# Patient Record
Sex: MALE | Race: WHITE | NOT HISPANIC OR LATINO
[De-identification: names, ages, dates, MRNs, and addresses within clinical notes are randomized per-mention and may not be internally consistent; named-entity substitution may affect disease eponyms.]

---

## 2017-06-18 ENCOUNTER — TRANSCRIPTION ENCOUNTER (OUTPATIENT)
Age: 42
End: 2017-06-18

## 2018-02-10 ENCOUNTER — TRANSCRIPTION ENCOUNTER (OUTPATIENT)
Age: 43
End: 2018-02-10

## 2020-11-04 PROBLEM — Z00.00 ENCOUNTER FOR PREVENTIVE HEALTH EXAMINATION: Status: ACTIVE | Noted: 2020-11-04

## 2020-12-10 ENCOUNTER — APPOINTMENT (OUTPATIENT)
Dept: NEUROLOGY | Facility: CLINIC | Age: 45
End: 2020-12-10

## 2022-02-03 ENCOUNTER — APPOINTMENT (OUTPATIENT)
Dept: NEUROLOGY | Facility: CLINIC | Age: 47
End: 2022-02-03
Payer: COMMERCIAL

## 2022-02-03 ENCOUNTER — NON-APPOINTMENT (OUTPATIENT)
Age: 47
End: 2022-02-03

## 2022-02-03 VITALS
HEART RATE: 97 BPM | DIASTOLIC BLOOD PRESSURE: 107 MMHG | BODY MASS INDEX: 31.1 KG/M2 | HEIGHT: 69 IN | SYSTOLIC BLOOD PRESSURE: 161 MMHG | WEIGHT: 210 LBS

## 2022-02-03 DIAGNOSIS — Z80.51 FAMILY HISTORY OF MALIGNANT NEOPLASM OF KIDNEY: ICD-10-CM

## 2022-02-03 DIAGNOSIS — Z82.61 FAMILY HISTORY OF ARTHRITIS: ICD-10-CM

## 2022-02-03 DIAGNOSIS — Z78.9 OTHER SPECIFIED HEALTH STATUS: ICD-10-CM

## 2022-02-03 DIAGNOSIS — I10 ESSENTIAL (PRIMARY) HYPERTENSION: ICD-10-CM

## 2022-02-03 DIAGNOSIS — E78.5 HYPERLIPIDEMIA, UNSPECIFIED: ICD-10-CM

## 2022-02-03 DIAGNOSIS — E03.9 HYPOTHYROIDISM, UNSPECIFIED: ICD-10-CM

## 2022-02-03 DIAGNOSIS — D72.819 DECREASED WHITE BLOOD CELL COUNT, UNSPECIFIED: ICD-10-CM

## 2022-02-03 DIAGNOSIS — Z86.39 PERSONAL HISTORY OF OTHER ENDOCRINE, NUTRITIONAL AND METABOLIC DISEASE: ICD-10-CM

## 2022-02-03 DIAGNOSIS — R06.83 SNORING: ICD-10-CM

## 2022-02-03 DIAGNOSIS — R73.9 HYPERGLYCEMIA, UNSPECIFIED: ICD-10-CM

## 2022-02-03 DIAGNOSIS — M06.9 RHEUMATOID ARTHRITIS, UNSPECIFIED: ICD-10-CM

## 2022-02-03 PROCEDURE — 99244 OFF/OP CNSLTJ NEW/EST MOD 40: CPT

## 2022-02-03 RX ORDER — ETANERCEPT 50 MG/ML
50 SOLUTION SUBCUTANEOUS
Refills: 0 | Status: ACTIVE | COMMUNITY

## 2022-02-03 RX ORDER — FLUTICASONE PROPIONATE 50 UG/1
50 SPRAY, METERED NASAL
Refills: 0 | Status: ACTIVE | COMMUNITY

## 2022-02-03 RX ORDER — ATORVASTATIN CALCIUM 20 MG/1
20 TABLET, FILM COATED ORAL
Refills: 0 | Status: ACTIVE | COMMUNITY

## 2022-02-03 RX ORDER — LEVOTHYROXINE SODIUM 200 UG/1
200 TABLET ORAL
Refills: 0 | Status: ACTIVE | COMMUNITY

## 2022-02-03 RX ORDER — FEXOFENADINE HCL 60 MG
CAPSULE ORAL
Refills: 0 | Status: ACTIVE | COMMUNITY

## 2022-02-03 RX ORDER — SEMAGLUTIDE 1.34 MG/ML
2 INJECTION, SOLUTION SUBCUTANEOUS
Refills: 0 | Status: ACTIVE | COMMUNITY

## 2022-02-03 RX ORDER — AMLODIPINE BESYLATE 10 MG/1
10 TABLET ORAL
Refills: 0 | Status: ACTIVE | COMMUNITY

## 2022-02-03 NOTE — HISTORY OF PRESENT ILLNESS
[FreeTextEntry1] : Mr. Mcgarry is here today for neurology evaluation.\par He has a long history of snoring.\par His wife has noted snoring and pauses in his breathing.\par He is aware of arousals throughout the night. During some of these arousals he feels as if he is gasping for air.\par He usually sleeps on his side.\par He wakes up with a sore, dry throat. He does not wake up with headaches.\par \par He goes to bed between 10-11 PM.\par He falls asleep quickly.\par Since working from home he wakes up at about 6 AM.\par He does not feel well rested when he wakes up.\par \par He does not take naps.\par Sometimes he falls asleep while watching television.\par He drinks ~ 2-3 cups ten ounce cups of coffee per day.\par \par He denies symptoms of Restless Legs Syndrome.\par \par There is no history of dream enactment behavior, hypnagogic/hypnopompic hallucinations, sleep paralysis or cataplexy.\par \par His [place] Sleepiness Scale score is 15/24.

## 2022-02-03 NOTE — CONSULT LETTER
[Dear  ___] : Dear  [unfilled], [Consult Letter:] : I had the pleasure of evaluating your patient, [unfilled]. [Please see my note below.] : Please see my note below. [Consult Closing:] : Thank you very much for allowing me to participate in the care of this patient.  If you have any questions, please do not hesitate to contact me. [FreeTextEntry2] : Xochitl Harding [FreeTextEntry3] : Sincerely,\par \par \par Amanda Saab MD\par Diplomate, [employer] Neu[employer]\par Board Certified in the Subspecialty of Clinical Neurophysiology\par Board Certified in the Subspecialty of Sleep Medicine\par Board Certified in the Subspecialty of Epilepsy\par

## 2022-02-03 NOTE — DISCUSSION/SUMMARY
[FreeTextEntry1] : Mr. Whitmore is a 46 year old man with reports of unrefreshing sleep, snoring and witnessed pauses in his breathing during sleep.\par His examination is significant for a low lying palate.\par His [place] Sleepiness Scale Score is 15/24.\par \par I do think that this history is suggestive of obstructive sleep apnea.\par We discussed the risks of untreated sleep apnea including sleepiness, hypertension, increased risk for heart disease and stroke, worsening seizure frequency and cognitive impairment.\par \par I am ordering a home sleep study.\par \par We discussed different treatments for LIAN including CPAP, oral appliance, surgery and weight loss. \par We will discuss these options in more detail after his study.\par \par I will call him with study results, and office follow up will be arranged for 2-3 months.\par \par

## 2022-02-03 NOTE — PHYSICAL EXAM
[FreeTextEntry1] : Examination:\par Constitutional: normal, no apparent distress\par oropharynx: low lying soft palate. Mallampati 3\par Eyes: normal conjunctiva b/l, no ptosis, visual fields full\par Respiratory: no respiratory distress, normal effort, normal auscultation\par Cardiovascular: normal rate, rhythm, no murmurs\par Neck: supple, no masses\par Vascular: carotids normal\par Skin: normal color, no rashes\par Psych: normal mood, affect\par \par Neurological:\par Memory: normal memory, oriented to person, place, time\par Language intact/no aphasia\par Cranial Nerves: II-XII intact, Pupils equally round and reactive to light, ocular muscles/movements intact, no ptosis, no facial weakness, tongue protrudes normally in the midline, \par Motor: normal tone, no pronator drift, full strength in all four extremities in the proximal and distal muscle groups\par Coordination: Fine motor movements intact, rapid alternating movements intact, finger to nose intact bilaterally\par Sensory: intact to light touch, vibration\par DTRs: symmetric, 2+ in b/l triceps, 2+ in b/l biceps, 2+ in b/l brachioradialis, 2+ in bilateral patellars\par Gait: narrow based, steady\par \par \par \par

## 2022-02-08 ENCOUNTER — APPOINTMENT (OUTPATIENT)
Dept: NEUROLOGY | Facility: CLINIC | Age: 47
End: 2022-02-08
Payer: COMMERCIAL

## 2022-02-08 PROCEDURE — 95806 SLEEP STUDY UNATT&RESP EFFT: CPT

## 2022-02-17 ENCOUNTER — NON-APPOINTMENT (OUTPATIENT)
Age: 47
End: 2022-02-17

## 2023-04-26 ENCOUNTER — APPOINTMENT (OUTPATIENT)
Dept: NEUROLOGY | Facility: CLINIC | Age: 48
End: 2023-04-26
Payer: COMMERCIAL

## 2023-04-26 VITALS
DIASTOLIC BLOOD PRESSURE: 98 MMHG | HEART RATE: 99 BPM | BODY MASS INDEX: 31.84 KG/M2 | TEMPERATURE: 97.8 F | WEIGHT: 215 LBS | HEIGHT: 69 IN | SYSTOLIC BLOOD PRESSURE: 141 MMHG

## 2023-04-26 PROCEDURE — 99213 OFFICE O/P EST LOW 20 MIN: CPT

## 2023-04-26 NOTE — DISCUSSION/SUMMARY
[FreeTextEntry1] : Mr. Whitmore is a 47 year old man who initially presented with  reports of unrefreshing sleep, snoring and witnessed pauses in his breathing during sleep.\par \par A home sleep study showed severe LIAN with an AHI 60.8.\par Auto-PAP was prescribed.\par \par He has been using APAP with good effect.\par \par The patient is compliant with PAP. The patient feels a clinical benefit from PAP use and will continue to use PAP on a nightly basis.\par \par He will follow-up in one year and call before that time with any questions or concerns.\par \par \par \par

## 2023-04-26 NOTE — PHYSICAL EXAM
[FreeTextEntry1] : Examination:\par Constitutional: normal, no apparent distress\par oropharynx: low lying palate, scalloped tongue\par Eyes: normal conjunctiva b/l, no ptosis, visual fields full\par Respiratory: no respiratory distress, normal effort, normal auscultation\par Cardiovascular: normal rate, rhythm, no murmurs\par Neck: supple, no masses\par Vascular: carotids normal\par Skin: normal color, no rashes\par Psych: normal mood, affect\par \par Neurological:\par Memory: normal memory, oriented to person, place, time\par Language intact/no aphasia\par Cranial Nerves: II-XII intact, Pupils equally round and reactive to light, ocular muscles/movements intact, no ptosis, no facial weakness, tongue protrudes normally in the midline, \par Motor: normal tone, no pronator drift, full strength in all four extremities in the proximal and distal muscle groups\par Coordination: Fine motor movements intact, rapid alternating movements intact, finger to nose intact bilaterally\par Sensory: intact to light touch\par DTRs: symmetric, normal\par Gait: narrow based, steady\par

## 2023-04-26 NOTE — HISTORY OF PRESENT ILLNESS
[FreeTextEntry1] : 2/3/22:\par Mr. Mcgarry is here today for neurology evaluation.\par He has a long history of snoring.\par His wife has noted snoring and pauses in his breathing.\par He is aware of arousals throughout the night. During some of these arousals he feels as if he is gasping for air.\par He usually sleeps on his side.\par He wakes up with a sore, dry throat. He does not wake up with headaches.\par \par He goes to bed between 10-11 PM.\par He falls asleep quickly.\par Since working from home he wakes up at about 6 AM.\par He does not feel well rested when he wakes up.\par \par He does not take naps.\par Sometimes he falls asleep while watching television.\par He drinks ~ 2-3 cups ten ounce cups of coffee per day.\par \par He denies symptoms of Restless Legs Syndrome.\par \par There is no history of dream enactment behavior, hypnagogic/hypnopompic hallucinations, sleep paralysis or cataplexy.\par \par His [place] Sleepiness Scale score is 15/24.\par \par 4/26/23:\par He received his CPAP machine.\par He did not have significant trouble getting used to it and reports compliance and benefit.\par He does think he is sleeping more and waking up less frequently.\par His wife reports that snoring is much better.\par He is using a nasal pillow mask.\par \par His compliance report shows that he used CPAP 29 of the last 30 nights (1 night he was on a plane).\par His average usage is 6 hours 55 minutes.\par The average AHI is 1.5. \par The median pressure is 10.2.\par \par His [place] Sleepiness Scale Score is 6/24.

## 2024-03-27 ENCOUNTER — APPOINTMENT (OUTPATIENT)
Dept: NEUROLOGY | Facility: CLINIC | Age: 49
End: 2024-03-27
Payer: COMMERCIAL

## 2024-03-27 VITALS
SYSTOLIC BLOOD PRESSURE: 136 MMHG | DIASTOLIC BLOOD PRESSURE: 88 MMHG | TEMPERATURE: 98.2 F | BODY MASS INDEX: 27.4 KG/M2 | HEIGHT: 69 IN | HEART RATE: 97 BPM | WEIGHT: 185 LBS

## 2024-03-27 DIAGNOSIS — G47.33 OBSTRUCTIVE SLEEP APNEA (ADULT) (PEDIATRIC): ICD-10-CM

## 2024-03-27 PROCEDURE — 99213 OFFICE O/P EST LOW 20 MIN: CPT

## 2024-03-27 PROCEDURE — G2211 COMPLEX E/M VISIT ADD ON: CPT

## 2024-03-27 NOTE — HISTORY OF PRESENT ILLNESS
[FreeTextEntry1] : 2/3/22: Mr. Mcgarry is here today for neurology evaluation. He has a long history of snoring. His wife has noted snoring and pauses in his breathing. He is aware of arousals throughout the night. During some of these arousals he feels as if he is gasping for air. He usually sleeps on his side. He wakes up with a sore, dry throat. He does not wake up with headaches.  He goes to bed between 10-11 PM. He falls asleep quickly. Since working from home he wakes up at about 6 AM. He does not feel well rested when he wakes up.  He does not take naps. Sometimes he falls asleep while watching television. He drinks ~ 2-3 cups ten ounce cups of coffee per day.  He denies symptoms of Restless Legs Syndrome.  There is no history of dream enactment behavior, hypnagogic/hypnopompic hallucinations, sleep paralysis or cataplexy.  His [place] Sleepiness Scale score is 15/24.  4/26/23: He received his CPAP machine. He did not have significant trouble getting used to it and reports compliance and benefit. He does think he is sleeping more and waking up less frequently. His wife reports that snoring is much better. He is using a nasal pillow mask.  His compliance report shows that he used CPAP 29 of the last 30 nights (1 night he was on a plane). His average usage is 6 hours 55 minutes. The average AHI is 1.5.  The median pressure is 10.2.  His [place] Sleepiness Scale Score is 6/24.  3/27/24: He is using CPAP on a nightly basis.  He reports comfort with his nasal mask. He feels well rested upon awakening.  If he has an overnight flight and does not use CPAP he does not feel as well rested.  30 day compliance report through 3/26/24 shows usage for 30/30 days. The average usage is 8 hours 9 minutes. The average AHI if 0.5. There does not appear to be significant leakage.   His [place] Sleepiness Scale Score is 4/15.

## 2025-03-05 ENCOUNTER — APPOINTMENT (OUTPATIENT)
Dept: NEUROLOGY | Facility: CLINIC | Age: 50
End: 2025-03-05
Payer: COMMERCIAL

## 2025-03-05 DIAGNOSIS — G47.33 OBSTRUCTIVE SLEEP APNEA (ADULT) (PEDIATRIC): ICD-10-CM

## 2025-03-05 PROCEDURE — 99212 OFFICE O/P EST SF 10 MIN: CPT | Mod: 95

## 2025-03-05 PROCEDURE — G2211 COMPLEX E/M VISIT ADD ON: CPT | Mod: NC,95

## 2025-03-05 NOTE — HISTORY OF PRESENT ILLNESS
[FreeTextEntry1] : 2/3/22: Mr. Ellis is here today for neurology evaluation. He has a long history of snoring. His wife has noted snoring and pauses in his breathing. He is aware of arousals throughout the night. During some of these arousals he feels as if he is gasping for air. He usually sleeps on his side. He wakes up with a sore, dry throat. He does not wake up with headaches.  He goes to bed between 10-11 PM. He falls asleep quickly. Since working from home he wakes up at about 6 AM. He does not feel well rested when he wakes up.  He does not take naps. Sometimes he falls asleep while watching television. He drinks ~ 2-3 cups ten ounce cups of coffee per day.  He denies symptoms of Restless Legs Syndrome.  There is no history of dream enactment behavior, hypnagogic/hypnopompic hallucinations, sleep paralysis or cataplexy.  His [place] Sleepiness Scale score is 15/24.  4/26/23: He received his CPAP machine. He did not have significant trouble getting used to it and reports compliance and benefit. He does think he is sleeping more and waking up less frequently. His wife reports that snoring is much better. He is using a nasal pillow mask.  His compliance report shows that he used CPAP 29 of the last 30 nights (1 night he was on a plane). His average usage is 6 hours 55 minutes. The average AHI is 1.5.  The median pressure is 10.2.  His [place] Sleepiness Scale Score is 6/24.  3/27/24: He is using CPAP on a nightly basis.  He reports comfort with his nasal mask. He feels well rested upon awakening.  If he has an overnight flight and does not use CPAP he does not feel as well rested.   His [place] Sleepiness Scale Score is 4/24.  3/5/25: He is using APAP without difficulty. His wife does not report snoring. A 30 day compliance report through 3/3 shows usage for ~ 7.5 hours per night and an AHI of 0.8. He feels more rested during the day. He denies daytime sleepiness. his [place] Sleepiness Scale Score is 3/24

## 2025-03-05 NOTE — HISTORY OF PRESENT ILLNESS
[FreeTextEntry1] : 2/3/22: Mr. Ellis is here today for neurology evaluation. He has a long history of snoring. His wife has noted snoring and pauses in his breathing. He is aware of arousals throughout the night. During some of these arousals he feels as if he is gasping for air. He usually sleeps on his side. He wakes up with a sore, dry throat. He does not wake up with headaches.  He goes to bed between 10-11 PM. He falls asleep quickly. Since working from home he wakes up at about 6 AM. He does not feel well rested when he wakes up.  He does not take naps. Sometimes he falls asleep while watching television. He drinks ~ 2-3 cups ten ounce cups of coffee per day.  He denies symptoms of Restless Legs Syndrome.  There is no history of dream enactment behavior, hypnagogic/hypnopompic hallucinations, sleep paralysis or cataplexy.  His [place] Sleepiness Scale score is 15/24.  4/26/23: He received his CPAP machine. He did not have significant trouble getting used to it and reports compliance and benefit. He does think he is sleeping more and waking up less frequently. His wife reports that snoring is much better. He is using a nasal pillow mask.  His compliance report shows that he used CPAP 29 of the last 30 nights (1 night he was on a plane). His average usage is 6 hours 55 minutes. The average AHI is 1.5.  The median pressure is 10.2.  His [place] Sleepiness Scale Score is 6/24.  3/27/24: He is using CPAP on a nightly basis.  He reports comfort with his nasal mask. He feels well rested upon awakening.  If he has an overnight flight and does not use CPAP he does not feel as well rested.   His [place] Sleepiness Scale Score is 4/24.  3/5/25: He is using APAP without difficulty. His wife does not report snoring. A 30 day compliance report through 3/3 shows usage for ~ 7.5 hours per night and an AHI of 0.8. He feels more rested during the day. He denies daytime sleepiness. his [place] Sleepiness Scale Score is 3/24

## 2025-03-05 NOTE — DISCUSSION/SUMMARY
[FreeTextEntry1] : Mr. Yang is a 49 year old man who initially presented with reports of unrefreshing sleep, snoring and witnessed pauses in his breathing during sleep.  A home sleep study showed severe JANENE with an AHI 60.8. Auto-PAP was prescribed. He has been using APAP with good effect. His average AHI over the last 30 day sis 0.8.  He is compliant with PAP and t feels a clinical benefit from PAP. There has been a significant reduction in his [place] Sleepiness Scale Score. He will continue to use PAP on a nightly basis.  He will follow-up in one year and call before that time with any questions or concerns.

## 2025-03-05 NOTE — REASON FOR VISIT
[Follow-Up: _____] : a [unfilled] follow-up visit [Home] : at home, [unfilled] , at the time of the visit. [Medical Office: ([facility])___] : at the medical office located in  [Telehealth (audio & video)] : This visit was provided via telehealth using real-time 2-way audio visual technology. [Verbal consent obtained from patient] : the patient, [unfilled]

## 2025-03-05 NOTE — REASON FOR VISIT
[Follow-Up: _____] : a [unfilled] follow-up visit [Home] : at home, [unfilled] , at the time of the visit. [Medical Office: ([facility])___] : at the medical office located in  [Telehealth (audio & video)] : This visit was provided via telehealth using real-time 2-way audio visual technology. [Verbal consent obtained from patient] : the patient, [unfilled]

## 2025-03-05 NOTE — DISCUSSION/SUMMARY
[FreeTextEntry1] : Mr. Yang is a 49 year old man who initially presented with reports of unrefreshing sleep, snoring and witnessed pauses in his breathing during sleep.  A home sleep study showed severe JANENE with an AHI 60.8. Auto-PAP was prescribed. He has been using APAP with good effect. His average AHI over the last 30 day sis 0.8.  He is compliant with PAP and t feels a clinical benefit from PAP. There has been a significant reduction in his [place] Sleepiness Scale Score. He will continue to use PAP on a nightly basis.  He will follow-up in one year and call before that time with any questions or concerns.